# Patient Record
Sex: FEMALE | Race: WHITE | NOT HISPANIC OR LATINO | URBAN - METROPOLITAN AREA
[De-identification: names, ages, dates, MRNs, and addresses within clinical notes are randomized per-mention and may not be internally consistent; named-entity substitution may affect disease eponyms.]

---

## 2020-01-08 ENCOUNTER — OFFICE (OUTPATIENT)
Dept: URBAN - METROPOLITAN AREA CLINIC 33 | Facility: CLINIC | Age: 55
End: 2020-01-08

## 2020-01-08 VITALS
HEIGHT: 67 IN | WEIGHT: 212 LBS | HEART RATE: 84 BPM | SYSTOLIC BLOOD PRESSURE: 125 MMHG | TEMPERATURE: 97.9 F | DIASTOLIC BLOOD PRESSURE: 94 MMHG

## 2020-01-08 DIAGNOSIS — Z12.11 ENCOUNTER FOR SCREENING FOR MALIGNANT NEOPLASM OF COLON: ICD-10-CM

## 2020-01-08 DIAGNOSIS — K90.49 MALABSORPTION DUE TO INTOLERANCE, NOT ELSEWHERE CLASSIFIED: ICD-10-CM

## 2020-01-08 DIAGNOSIS — K62.5 HEMORRHAGE OF ANUS AND RECTUM: ICD-10-CM

## 2020-01-08 PROCEDURE — 99244 OFF/OP CNSLTJ NEW/EST MOD 40: CPT

## 2020-01-08 NOTE — SERVICEHPINOTES
MELISSA WHEELER   is a   54   year old    female who is being seen in consultation at the request of   ALLIE MENA   for rectal bleeding and two "lumps" on outside of anorectal area. She notes that BRBPR on two separate occasions in the past month. Each instance was associated with a BM. She denies any abdominal pain, nausea, vomiting, melena, constipation, loss of appetite, weight loss, heartburn, dysphagia. She does note diarrhea if she eats a lot of bread and she gets hives if she does intake wheat products. She denies any family history of IBD or CRC. She has never had a colonoscopy so she is also due for CRC screening.

## 2020-01-15 LAB
CELIAC DISEASE COMPREHENSIVE: DEAMIDATED GLIADIN ABS, IGA: 4 UNITS (ref 0–19)
CELIAC DISEASE COMPREHENSIVE: DEAMIDATED GLIADIN ABS, IGG: 4 UNITS (ref 0–19)
CELIAC DISEASE COMPREHENSIVE: ENDOMYSIAL ANTIBODY IGA: NEGATIVE
CELIAC DISEASE COMPREHENSIVE: IMMUNOGLOBULIN A, QN, SERUM: 131 MG/DL (ref 87–352)
CELIAC DISEASE COMPREHENSIVE: T-TRANSGLUTAMINASE (TTG) IGA: <2 U/ML
CELIAC DISEASE COMPREHENSIVE: T-TRANSGLUTAMINASE (TTG) IGG: <2 U/ML
Lab: (no result)